# Patient Record
Sex: FEMALE | Race: BLACK OR AFRICAN AMERICAN
[De-identification: names, ages, dates, MRNs, and addresses within clinical notes are randomized per-mention and may not be internally consistent; named-entity substitution may affect disease eponyms.]

---

## 2014-07-27 VITALS
SYSTOLIC BLOOD PRESSURE: 137 MMHG | SYSTOLIC BLOOD PRESSURE: 137 MMHG | SYSTOLIC BLOOD PRESSURE: 137 MMHG | DIASTOLIC BLOOD PRESSURE: 71 MMHG | DIASTOLIC BLOOD PRESSURE: 71 MMHG | SYSTOLIC BLOOD PRESSURE: 137 MMHG | DIASTOLIC BLOOD PRESSURE: 71 MMHG | DIASTOLIC BLOOD PRESSURE: 71 MMHG | SYSTOLIC BLOOD PRESSURE: 137 MMHG | SYSTOLIC BLOOD PRESSURE: 137 MMHG | DIASTOLIC BLOOD PRESSURE: 71 MMHG | DIASTOLIC BLOOD PRESSURE: 71 MMHG | SYSTOLIC BLOOD PRESSURE: 137 MMHG | SYSTOLIC BLOOD PRESSURE: 137 MMHG | DIASTOLIC BLOOD PRESSURE: 71 MMHG | SYSTOLIC BLOOD PRESSURE: 137 MMHG | DIASTOLIC BLOOD PRESSURE: 71 MMHG | DIASTOLIC BLOOD PRESSURE: 71 MMHG | DIASTOLIC BLOOD PRESSURE: 71 MMHG | SYSTOLIC BLOOD PRESSURE: 137 MMHG | SYSTOLIC BLOOD PRESSURE: 137 MMHG | DIASTOLIC BLOOD PRESSURE: 71 MMHG

## 2017-01-27 ENCOUNTER — HOSPITAL ENCOUNTER (OUTPATIENT)
Dept: HOSPITAL 44 - POD | Age: 77
End: 2017-01-27
Attending: PODIATRIST
Payer: MEDICARE

## 2017-01-27 DIAGNOSIS — B35.1: Primary | ICD-10-CM

## 2017-01-27 DIAGNOSIS — M79.675: ICD-10-CM

## 2017-01-27 DIAGNOSIS — M79.674: ICD-10-CM

## 2017-01-27 PROCEDURE — 11721 DEBRIDE NAIL 6 OR MORE: CPT

## 2017-04-14 ENCOUNTER — HOSPITAL ENCOUNTER (OUTPATIENT)
Dept: HOSPITAL 44 - POD | Age: 77
End: 2017-04-14
Attending: PODIATRIST
Payer: MEDICARE

## 2017-04-14 DIAGNOSIS — M79.674: ICD-10-CM

## 2017-04-14 DIAGNOSIS — B35.1: Primary | ICD-10-CM

## 2017-04-14 DIAGNOSIS — M79.675: ICD-10-CM

## 2017-04-14 PROCEDURE — 11721 DEBRIDE NAIL 6 OR MORE: CPT

## 2017-05-23 ENCOUNTER — HOSPITAL ENCOUNTER (OUTPATIENT)
Dept: HOSPITAL 44 - RT | Age: 77
End: 2017-05-23
Attending: FAMILY MEDICINE
Payer: MEDICARE

## 2017-05-23 DIAGNOSIS — R06.09: Primary | ICD-10-CM

## 2017-05-23 PROCEDURE — 94060 EVALUATION OF WHEEZING: CPT

## 2017-06-23 ENCOUNTER — HOSPITAL ENCOUNTER (OUTPATIENT)
Dept: HOSPITAL 44 - POD | Age: 77
End: 2017-06-23
Attending: PODIATRIST
Payer: MEDICARE

## 2017-06-23 DIAGNOSIS — B35.1: Primary | ICD-10-CM

## 2017-06-23 DIAGNOSIS — M79.674: ICD-10-CM

## 2017-06-23 DIAGNOSIS — M79.675: ICD-10-CM

## 2017-06-23 PROCEDURE — 11721 DEBRIDE NAIL 6 OR MORE: CPT

## 2017-09-21 ENCOUNTER — HOSPITAL ENCOUNTER (OUTPATIENT)
Dept: HOSPITAL 44 - LAB | Age: 77
End: 2017-09-21
Attending: FAMILY MEDICINE
Payer: MEDICARE

## 2017-09-21 DIAGNOSIS — R07.89: Primary | ICD-10-CM

## 2017-09-21 LAB
BASOPHILS NFR BLD: 0.5 % (ref 0–1.5)
EOSINOPHIL NFR BLD: 3 % (ref 0–6.8)
MCH RBC QN AUTO: 27.8 PG (ref 28–34)
MCV RBC AUTO: 88.2 FL (ref 80–100)
MONOCYTES %: 4.3 % (ref 0–11)
NEUTROPHILS #: 2.9 # K/UL (ref 1.4–7.7)
PROT SERPL-MCNC: 7.4 G/DL (ref 6–8.5)

## 2017-09-21 PROCEDURE — 85025 COMPLETE CBC W/AUTO DIFF WBC: CPT

## 2017-09-21 PROCEDURE — 71020: CPT

## 2017-09-21 PROCEDURE — 80053 COMPREHEN METABOLIC PANEL: CPT

## 2017-09-21 PROCEDURE — 84484 ASSAY OF TROPONIN QUANT: CPT

## 2017-09-21 PROCEDURE — 36415 COLL VENOUS BLD VENIPUNCTURE: CPT

## 2017-09-21 PROCEDURE — 80061 LIPID PANEL: CPT

## 2017-09-21 NOTE — DIAGNOSTIC IMAGING REPORT
RACHNA MATA 

[facility]

[id number] [facility].[address] [address]

 

 

 

 

Report Submission Date: Sep 21, 2017 12:59:55 PM CDT

Patient       Study

Name:   DENA MOON       Date:   Sep 21, 2017 11:24:51 AM CDT

MRN:   [id number]       Modality Type:   CR

Gender:   F       Description:   CHEST

:   38       Institution:   [facility]

Physician:   RACHNA MATA

         

 

 

Examination: PA and lateral chest. 



History: Evaluate lung fields. 



Comparison exam: None provided 



Findings: PA lateral chest demonstrate a normal cardiac silhouette. Mild 
tortuosity of the thoracic aorta with vascular calcifications involving the 
aortic arch. Right hilar calcified granuloma. No peripheral consolidation. No 
effusion.  No blunting of the costophrenic margins.  Osseous structures are 
appropriate for age. 



Impression: No acute pulmonary process.

 

Electronically signed on Sep 21, 2017 12:59:55 PM CDT by:

Chente HOWELL

## 2017-09-22 ENCOUNTER — HOSPITAL ENCOUNTER (OUTPATIENT)
Dept: HOSPITAL 44 - POD | Age: 77
End: 2017-09-22
Attending: PODIATRIST
Payer: MEDICARE

## 2017-09-22 DIAGNOSIS — M79.675: ICD-10-CM

## 2017-09-22 DIAGNOSIS — B35.1: Primary | ICD-10-CM

## 2017-09-22 DIAGNOSIS — M79.674: ICD-10-CM

## 2017-09-22 PROCEDURE — 11721 DEBRIDE NAIL 6 OR MORE: CPT

## 2017-10-03 ENCOUNTER — HOSPITAL ENCOUNTER (OUTPATIENT)
Dept: HOSPITAL 44 - CARD | Age: 77
End: 2017-10-03
Attending: INTERNAL MEDICINE
Payer: MEDICARE

## 2017-10-03 DIAGNOSIS — R73.03: ICD-10-CM

## 2017-10-03 DIAGNOSIS — R78.5: ICD-10-CM

## 2017-10-03 DIAGNOSIS — R07.89: Primary | ICD-10-CM

## 2017-10-03 DIAGNOSIS — M79.606: ICD-10-CM

## 2017-10-03 DIAGNOSIS — R03.0: ICD-10-CM

## 2017-10-05 ENCOUNTER — HOSPITAL ENCOUNTER (OUTPATIENT)
Dept: HOSPITAL 44 - RAD | Age: 77
End: 2017-10-05
Attending: INTERNAL MEDICINE
Payer: MEDICARE

## 2017-10-05 DIAGNOSIS — R07.9: Primary | ICD-10-CM

## 2017-10-05 DIAGNOSIS — I25.110: ICD-10-CM

## 2017-10-05 PROCEDURE — 93922 UPR/L XTREMITY ART 2 LEVELS: CPT

## 2017-10-05 NOTE — DIAGNOSTIC IMAGING REPORT
LAUREEN WALSH 

[facility]

[address] [address] [address]

 

 

 

 

Report Submission Date: Oct 5, 2017 9:47:51 AM CDT

Patient       Study

Name:   DENA MOON       Date:   Oct 5, 2017 9:10:12 AM CDT

MRN:   [id number]       Modality Type:   US\OT

Gender:   F       Description:   LIMITED BILAT PAM 1-2 LVL

:   38       Institution:   [facility]

Physician:   LAUREEN WALSH bilateral ankle brachial index 



History: Feet numbness 



Findings: The right arm blood pressure cannot be obtained before the left arm 
blood pressure was utilized for the ratios for this examination. 



Ankle brachial index on the right at the level of the posterior tibial artery 
is 0.57. Lowest ankle brachial index on the left 0.71 also obtained at the 
posterior tibial artery. This is consistent with a mild degree of peripheral 
arterial disease. The waveforms are normal. 



Impression: 



1. Mild decrease in the ankle brachial index suggesting mild peripheral 
vascular disease

 

Electronically signed on Oct 5, 2017 9:47:51 AM CDT by:

Serg HOWELL

## 2017-10-24 ENCOUNTER — HOSPITAL ENCOUNTER (OUTPATIENT)
Dept: HOSPITAL 44 - CARD | Age: 77
End: 2017-10-24
Attending: INTERNAL MEDICINE
Payer: MEDICARE

## 2017-10-24 DIAGNOSIS — I10: ICD-10-CM

## 2017-10-24 DIAGNOSIS — I73.9: ICD-10-CM

## 2017-10-24 DIAGNOSIS — R73.03: ICD-10-CM

## 2017-10-24 DIAGNOSIS — I42.8: ICD-10-CM

## 2017-10-24 DIAGNOSIS — E78.5: ICD-10-CM

## 2017-10-24 DIAGNOSIS — R07.89: Primary | ICD-10-CM

## 2018-01-12 ENCOUNTER — HOSPITAL ENCOUNTER (OUTPATIENT)
Dept: HOSPITAL 44 - POD | Age: 78
End: 2018-01-12
Attending: PODIATRIST
Payer: MEDICARE

## 2018-01-12 DIAGNOSIS — M79.675: ICD-10-CM

## 2018-01-12 DIAGNOSIS — M79.674: ICD-10-CM

## 2018-01-12 DIAGNOSIS — B35.1: Primary | ICD-10-CM

## 2018-01-12 PROCEDURE — 11721 DEBRIDE NAIL 6 OR MORE: CPT

## 2018-04-13 ENCOUNTER — HOSPITAL ENCOUNTER (OUTPATIENT)
Dept: HOSPITAL 44 - POD | Age: 78
End: 2018-04-13
Attending: PODIATRIST
Payer: MEDICARE

## 2018-04-13 DIAGNOSIS — M79.674: ICD-10-CM

## 2018-04-13 DIAGNOSIS — B35.1: Primary | ICD-10-CM

## 2018-04-13 DIAGNOSIS — M79.675: ICD-10-CM

## 2018-04-13 PROCEDURE — 11721 DEBRIDE NAIL 6 OR MORE: CPT

## 2018-04-17 ENCOUNTER — HOSPITAL ENCOUNTER (OUTPATIENT)
Age: 78
End: 2018-04-17
Payer: MEDICARE